# Patient Record
Sex: FEMALE | Race: WHITE
[De-identification: names, ages, dates, MRNs, and addresses within clinical notes are randomized per-mention and may not be internally consistent; named-entity substitution may affect disease eponyms.]

---

## 2018-05-04 ENCOUNTER — HOSPITAL ENCOUNTER (EMERGENCY)
Dept: HOSPITAL 17 - PHED | Age: 58
LOS: 1 days | Discharge: HOME | End: 2018-05-05
Payer: COMMERCIAL

## 2018-05-04 VITALS
TEMPERATURE: 97.8 F | OXYGEN SATURATION: 97 % | SYSTOLIC BLOOD PRESSURE: 175 MMHG | RESPIRATION RATE: 18 BRPM | HEART RATE: 79 BPM | DIASTOLIC BLOOD PRESSURE: 92 MMHG

## 2018-05-04 VITALS — HEIGHT: 65 IN | WEIGHT: 147.49 LBS | BODY MASS INDEX: 24.57 KG/M2

## 2018-05-04 DIAGNOSIS — F17.210: ICD-10-CM

## 2018-05-04 DIAGNOSIS — S01.451A: Primary | ICD-10-CM
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
## 2018-05-04 PROCEDURE — 99283 EMERGENCY DEPT VISIT LOW MDM: CPT

## 2018-05-05 VITALS — DIASTOLIC BLOOD PRESSURE: 86 MMHG | SYSTOLIC BLOOD PRESSURE: 156 MMHG

## 2018-05-05 NOTE — PD
HPI


Chief Complaint:  Bite or Sting


Time Seen by Provider:  00:15


Travel History


International Travel<30 days:  No


Contact w/Intl Traveler<30days:  No


Traveled to known affect area:  No





History of Present Illness


HPI


57-year-old female complains of dog bite to the face.  Patient states that her 

own dog bit her on the face this evening.  Patient states that she has small 

laceration the right side of face.  Patient denies any other injury.  Patient 

states that she is up-to-date with TD booster.





PFSH


Past Medical History


Diminished Hearing:  No


Tetanus Vaccination:  < 5 Years


Influenza Vaccination:  No


Pregnant?:  Not Pregnant


Menopausal:  Yes


Tubal Ligation:  Yes





Past Surgical History


Appendectomy:  Yes


Gynecologic Surgery:  Yes (tubal ligation)





Social History


Alcohol Use:  Yes ([employer])


Tobacco Use:  No (4 CIGS A DAY FOR 15 YEARS, quit in may)


Substance Use:  No





Allergies-Medications


(Allergen,Severity, Reaction):  


Coded Allergies:  


     No Known Allergies (Verified  Adverse Reaction, Unknown, 5/4/18)


Reported Meds & Prescriptions





Reported Meds & Active Scripts


Active


No Active Prescriptions or Reported Medications    








Review of Systems


General / Constitutional:  No: Fever


Eyes:  No: Visual changes


HENT:  No: Headaches


Cardiovascular:  No: Chest Pain or Discomfort


Respiratory:  No: Shortness of Breath


Gastrointestinal:  No: Abdominal Pain


Genitourinary:  No: Dysuria


Musculoskeletal:  No: Pain


Skin:  No Rash


Neurologic:  No: Weakness


Psychiatric:  No: Depression


Endocrine:  No: Polydipsia


Hematologic/Lymphatic:  No: Easy Bruising





Physical Exam


Narrative


GENERAL: Well-nourished, well-developed patient.


SKIN: Focused skin assessment warm/dry.


HEAD: Normocephalic.


EYES: No scleral icterus. No injection or drainage. 


NECK: Supple, trachea midline. No JVD or lymphadenopathy.


CARDIOVASCULAR: Regular rate and rhythm without murmurs, gallops, or rubs. 


RESPIRATORY: Breath sounds equal bilaterally. No accessory muscle use.


GASTROINTESTINAL: Abdomen soft, non-tender, nondistended. 


MUSCULOSKELETAL: No cyanosis, or edema. 


BACK: Nontender without obvious deformity. No CVA tenderness.


Patient has 0.5 cm laceration superficial laceration the right sided cheek 

area.  No active bleeding.  Patient has several abrasions the right upper lip.  

Mild soft tissue swelling noted.





Data


Data


Last Documented VS





Vital Signs








  Date Time  Temp Pulse Resp B/P (MAP) Pulse Ox O2 Delivery O2 Flow Rate FiO2


 


5/4/18 23:07 97.8 79 18 175/92 (119) 97   








Orders





 Orders


Ed Discharge Order (5/5/18 00:31)


Amoxicil-Clavulanate (Augmentin) (5/5/18 00:45)








MDM


Medical Decision Making


Medical Screen Exam Complete:  Yes


Emergency Medical Condition:  Yes


Differential Diagnosis


Differential diagnosis including facial laceration.


Narrative Course


57-year-old female with dog bite and superficial laceration to the right side 

the face.  No need for suture at this point.  Augmentin 875 mg p.o. given.





Diagnosis





 Primary Impression:  


 Dog bite of face


 Qualified Codes:  S01.85XA - Open bite of other part of head, initial encounter

; W54.0XXA - Bitten by dog, initial encounter


Patient Instructions:  General Instructions





***Additional Instructions:  


Augmentin as directed.  Wound care daily.  Follow-up with personal physician.  

Return if increasing redness swelling.


***Med/Other Pt SpecificInfo:  Prescription(s) given


Scripts


Amoxicillin-Clavulanate (Augmentin) 875-125 Mg Tab


1 TAB PO BID for Infection, #10 TAB 0 Refills


   Prov: Stan France MD         5/5/18


Disposition:  01 DISCHARGE HOME


Condition:  Stable











Stan France MD May 5, 2018 00:35